# Patient Record
Sex: FEMALE | Race: BLACK OR AFRICAN AMERICAN | NOT HISPANIC OR LATINO | Employment: UNEMPLOYED | URBAN - METROPOLITAN AREA
[De-identification: names, ages, dates, MRNs, and addresses within clinical notes are randomized per-mention and may not be internally consistent; named-entity substitution may affect disease eponyms.]

---
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
## 2025-03-10 DIAGNOSIS — I10 PRIMARY HYPERTENSION: Primary | ICD-10-CM

## 2025-03-11 NOTE — NURSING NOTE
"[facility] [[id number]]  Chief Complaint   Patient presents with     RECHECK     HTN     Initial /84 (BP Location: Right arm, Patient Position: Sitting, Cuff Size: Child)  Pulse 80  Ht 5' 0.98\" (154.9 cm)  Wt 86 lb 13.8 oz (39.4 kg)  BMI 16.42 kg/m2 Estimated body mass index is 16.42 kg/(m^2) as calculated from the following:    Height as of this encounter: 5' 0.98\" (154.9 cm).    Weight as of this encounter: 86 lb 13.8 oz (39.4 kg).  Medication Reconciliation: complete Fern Johnson LPN    Patient/Family was offered and declined mychart    " "RENAL/KCC:     LOS: 8 days    Patient Care Team:  Kvng Guillen MD as PCP - General (Family Medicine)  Jak Gavin MD as Cardiologist (Cardiology)    Chief Complaint:  TONYA/CKD, Kidney Transplant    Subjective     Interval History:   Chart reviewed  Anxious   O2 narrowly better    Objective     Vital Sign Min/Max for last 24 hours  Temp  Min: 97.3 °F (36.3 °C)  Max: 99 °F (37.2 °C)   BP  Min: 92/62  Max: 119/70   Pulse  Min: 94  Max: 120   Resp  Min: 15  Max: 24   SpO2  Min: 90 %  Max: 100 %   Flow (L/min) (Oxygen Therapy)  Min: 6  Max: 10   Weight  Min: 75.3 kg (166 lb)  Max: 83.5 kg (184 lb)     Flowsheet Rows      Flowsheet Row First Filed Value   Admission Height 167.7 cm (66.02\") Documented at 03/03/2025 1641   Admission Weight 81.6 kg (179 lb 14.3 oz) Documented at 03/03/2025 1641            No intake/output data recorded.  I/O last 3 completed shifts:  In: 120 [P.O.:120]  Out: 1700 [Urine:1700]    Physical Exam:  GEN: Awake, Anxious  ENT: PERRL, EOMI, MMM  NECK: Supple, no JVD  CHEST: CTAB, no W/R/C  CV: RRR, no M/G/R, +edema  ABD: Soft, NT, +BS  SKIN: Warm and Dry  NEURO: CN's intact      WBC WBC   Date Value Ref Range Status   03/11/2025 6.90 3.40 - 10.80 10*3/mm3 Final   03/09/2025 7.04 3.40 - 10.80 10*3/mm3 Final   03/09/2025 7.45 3.40 - 10.80 10*3/mm3 Final      HGB Hemoglobin   Date Value Ref Range Status   03/11/2025 7.7 (L) 12.0 - 15.9 g/dL Final   03/09/2025 7.7 (L) 12.0 - 15.9 g/dL Final   03/09/2025 8.0 (L) 12.0 - 15.9 g/dL Final      HCT Hematocrit   Date Value Ref Range Status   03/11/2025 26.6 (L) 34.0 - 46.6 % Final   03/09/2025 26.3 (L) 34.0 - 46.6 % Final   03/09/2025 27.6 (L) 34.0 - 46.6 % Final      Platlets No results found for: \"LABPLAT\"   MCV MCV   Date Value Ref Range Status   03/11/2025 100.0 (H) 79.0 - 97.0 fL Final   03/09/2025 101.5 (H) 79.0 - 97.0 fL Final   03/09/2025 101.1 (H) 79.0 - 97.0 fL Final          Sodium Sodium   Date Value Ref Range Status   03/11/2025 135 (L) 136 - " "145 mmol/L Final   03/09/2025 134 (L) 136 - 145 mmol/L Final   03/09/2025 135 (L) 136 - 145 mmol/L Final      Potassium Potassium   Date Value Ref Range Status   03/11/2025 4.0 3.5 - 5.2 mmol/L Final   03/09/2025 4.5 3.5 - 5.2 mmol/L Final   03/09/2025 4.4 3.5 - 5.2 mmol/L Final      Chloride Chloride   Date Value Ref Range Status   03/11/2025 96 (L) 98 - 107 mmol/L Final   03/09/2025 96 (L) 98 - 107 mmol/L Final   03/09/2025 97 (L) 98 - 107 mmol/L Final      CO2 CO2   Date Value Ref Range Status   03/11/2025 32.1 (H) 22.0 - 29.0 mmol/L Final   03/09/2025 28.8 22.0 - 29.0 mmol/L Final   03/09/2025 29.0 22.0 - 29.0 mmol/L Final      BUN BUN   Date Value Ref Range Status   03/11/2025 48 (H) 8 - 23 mg/dL Final   03/09/2025 47 (H) 8 - 23 mg/dL Final   03/09/2025 48 (H) 8 - 23 mg/dL Final      Creatinine Creatinine   Date Value Ref Range Status   03/11/2025 1.14 (H) 0.57 - 1.00 mg/dL Final   03/09/2025 1.14 (H) 0.57 - 1.00 mg/dL Final   03/09/2025 1.14 (H) 0.57 - 1.00 mg/dL Final      Calcium Calcium   Date Value Ref Range Status   03/11/2025 9.5 8.6 - 10.5 mg/dL Final   03/09/2025 9.3 8.6 - 10.5 mg/dL Final   03/09/2025 9.5 8.6 - 10.5 mg/dL Final      PO4 No results found for: \"CAPO4\"   Albumin Albumin   Date Value Ref Range Status   03/11/2025 3.1 (L) 3.5 - 5.2 g/dL Final   03/09/2025 3.0 (L) 3.5 - 5.2 g/dL Final   03/09/2025 3.0 (L) 3.5 - 5.2 g/dL Final      Magnesium Magnesium   Date Value Ref Range Status   03/11/2025 2.0 1.6 - 2.4 mg/dL Final   03/09/2025 2.0 1.6 - 2.4 mg/dL Final   03/09/2025 1.9 1.6 - 2.4 mg/dL Final      Uric Acid No results found for: \"URICACID\"        Results Review:     I reviewed the patient's new clinical results.    apixaban, 5 mg, Oral, Q12H  DULoxetine, 40 mg, Oral, Daily  furosemide, 80 mg, Intravenous, Q12H  HYDROcodone-acetaminophen, 1 tablet, Oral, Q6H  levothyroxine, 137 mcg, Oral, Q AM  metOLazone, 5 mg, Oral, Once  metoprolol succinate XL, 12.5 mg, Oral, Q12H  midodrine, 15 mg, " Oral, Q8H  mirtazapine, 15 mg, Oral, Nightly  polyethylene glycol, 17 g, Oral, Daily  sennosides-docusate, 1 tablet, Oral, BID  sodium chloride, 10 mL, Intravenous, Q12H  sodium chloride, 10 mL, Intravenous, Q12H  tacrolimus, 0.5 mg, Oral, BID  vitamin B-12, 1,000 mcg, Oral, Daily      Medication Review: Reviewed    Assessment & Plan     TONYA  CKD3  Kidney Transplant  UTI  Fluid overload  Hypotension  Viral URI  RUE swelling - AVF with thrill and bruit  Anemia     PLAN: Cr improved and stable at 1.14 (recent baseline Cr 1.2).  Continue Prograf - level OK.  Continue Midodrine for BP support.  Continue IV Furosemide and give Metolazone again today.  OK back to rehab once O2 requirement back to baseline.  Will follow.      Naun Falcon MD  [facility]  03/11/25  09:04 EDT